# Patient Record
Sex: FEMALE | Race: WHITE | NOT HISPANIC OR LATINO | Employment: STUDENT | URBAN - METROPOLITAN AREA
[De-identification: names, ages, dates, MRNs, and addresses within clinical notes are randomized per-mention and may not be internally consistent; named-entity substitution may affect disease eponyms.]

---
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
## 2024-01-11 ENCOUNTER — OFFICE VISIT (OUTPATIENT)
Dept: OBGYN CLINIC | Facility: CLINIC | Age: 19
End: 2024-01-11
Payer: COMMERCIAL

## 2024-01-11 ENCOUNTER — APPOINTMENT (OUTPATIENT)
Dept: RADIOLOGY | Facility: CLINIC | Age: 19
End: 2024-01-11
Payer: COMMERCIAL

## 2024-01-11 ENCOUNTER — ATHLETIC TRAINING (OUTPATIENT)
Dept: SPORTS MEDICINE | Facility: OTHER | Age: 19
End: 2024-01-11

## 2024-01-11 VITALS
SYSTOLIC BLOOD PRESSURE: 121 MMHG | HEART RATE: 68 BPM | HEIGHT: 68 IN | WEIGHT: 141 LBS | BODY MASS INDEX: 21.37 KG/M2 | DIASTOLIC BLOOD PRESSURE: 74 MMHG

## 2024-01-11 DIAGNOSIS — S43.102A AC SEPARATION, LEFT, INITIAL ENCOUNTER: Primary | ICD-10-CM

## 2024-01-11 DIAGNOSIS — M25.512 ACUTE PAIN OF LEFT SHOULDER: Primary | ICD-10-CM

## 2024-01-11 DIAGNOSIS — M25.512 LEFT SHOULDER PAIN, UNSPECIFIED CHRONICITY: ICD-10-CM

## 2024-01-11 DIAGNOSIS — S40.012A CONTUSION OF LEFT SHOULDER, INITIAL ENCOUNTER: ICD-10-CM

## 2024-01-11 PROCEDURE — 99214 OFFICE O/P EST MOD 30 MIN: CPT | Performed by: ORTHOPAEDIC SURGERY

## 2024-01-11 PROCEDURE — 73030 X-RAY EXAM OF SHOULDER: CPT

## 2024-01-11 RX ORDER — NORETHINDRONE ACETATE AND ETHINYL ESTRADIOL .02; 1 MG/1; MG/1
1 TABLET ORAL DAILY
COMMUNITY
Start: 2023-12-24

## 2024-01-11 NOTE — LETTER
January 11, 2024     Vinod Yokastafernando    Patient: Jessica Domingo   YOB: 2005   Date of Visit: 1/11/2024       Thank you for referring Jessica Domingo to me for evaluation. Below are my notes for this consultation.    If you have questions, please do not hesitate to call me. I look forward to following your patient along with you.         Sincerely,        Francis Aguirre DO        CC: No Recipients    Francis Aguirre DO  1/11/2024  4:48 PM  Sign when Signing Visit  Assessment/Plan:  1. AC separation, left, initial encounter  XR shoulder 2+ vw left      2. Contusion of left shoulder, initial encounter            Jessica has left shoulder pain after her fall yesterday and appears to have symptoms consistent with an AC separation in the shoulder.  Most of her pain can be attributed to the AC joint she also has discomfort in the scapular region but no obvious finding of fracture on x-ray.  I do think she might have a contusion of her scapula after the fall.  X-rays today show no clear abnormality and she has limitations her exam based on her pain.  I recommended use of a sling and follow-up in the office in 1 week for repeat evaluation.  If there is continued clinical concern and no significant progress at that point we could consider MRI.    Subjective:   Jessica Domingo is a 18 y.o. female who presents to the office for evaluation for left-sided shoulder pain.  She is a [employer] .  She injured her shoulder yesterday during a basketball game when she was taking a charge and fell backward on her left arm and shoulder.  She had pain and discomfort was removed from the game.  She has been having increased pain in the shoulder and difficulty lifting her arm.  She denies any numbness down her arm.  She has been treated with a sling.  There was concern for an AC separation versus clavicle fracture based on her examination.  She also has increased pain posteriorly in her  shoulder.      Review of Systems   Constitutional:  Negative for chills, fever and unexpected weight change.   HENT:  Negative for hearing loss, nosebleeds and sore throat.    Eyes:  Negative for pain, redness and visual disturbance.   Respiratory:  Negative for cough, shortness of breath and wheezing.    Cardiovascular:  Negative for chest pain, palpitations and leg swelling.   Gastrointestinal:  Negative for abdominal pain, nausea and vomiting.   Endocrine: Negative for polydipsia and polyuria.   Genitourinary:  Negative for dysuria and hematuria.   Musculoskeletal:         See HPI   Skin:  Negative for rash and wound.   Neurological:  Negative for dizziness, numbness and headaches.   Psychiatric/Behavioral:  Negative for decreased concentration and suicidal ideas. The patient is not nervous/anxious.          History reviewed. No pertinent past medical history.    History reviewed. No pertinent surgical history.    History reviewed. No pertinent family history.    Social History     Occupational History   • Not on file   Tobacco Use   • Smoking status: Never   • Smokeless tobacco: Never   Vaping Use   • Vaping status: Never Used   Substance and Sexual Activity   • Alcohol use: Never   • Drug use: Never   • Sexual activity: Not on file         Current Outpatient Medications:   •  norethindrone-ethinyl estradiol (MICROGESTIN 1/20) 1-20 MG-MCG per tablet, Take 1 tablet by mouth daily As directed, Disp: , Rfl:   •  Blisovi FE 1/20 1-20 MG-MCG per tablet, , Disp: , Rfl:     Allergies   Allergen Reactions   • Penicillins Rash and Dermatitis       Objective:  Vitals:    01/11/24 1344   BP: 121/74   Pulse: 68     Pain Score:   9      Left Shoulder Exam     Tenderness   The patient is experiencing tenderness in the acromioclavicular joint, clavicle and acromion (Scapular spine).    Range of Motion   Active abduction:  40 abnormal   Passive abduction:  50 abnormal   External rotation:  normal   Forward flexion:  50 abnormal      Muscle Strength   Abduction: 4/5   Internal rotation: 4/5   External rotation: 4/5   Supraspinatus: 4/5   Subscapularis: 4/5     Tests   Cross arm: positive  Drop arm: negative    Other   Erythema: absent  Sensation: normal  Pulse: present             Physical Exam  Vitals and nursing note reviewed.   Constitutional:       Appearance: Normal appearance. She is well-developed.   HENT:      Head: Normocephalic and atraumatic.      Right Ear: External ear normal.      Left Ear: External ear normal.   Eyes:      General: No scleral icterus.     Extraocular Movements: Extraocular movements intact.      Conjunctiva/sclera: Conjunctivae normal.   Cardiovascular:      Rate and Rhythm: Normal rate.   Pulmonary:      Effort: Pulmonary effort is normal. No respiratory distress.   Musculoskeletal:      Cervical back: Normal range of motion and neck supple.      Comments: See Ortho exam   Skin:     General: Skin is warm and dry.   Neurological:      General: No focal deficit present.      Mental Status: She is alert and oriented to person, place, and time.   Psychiatric:         Behavior: Behavior normal.         I have personally reviewed pertinent films in PACS and my interpretation is as follows:  X-ray left shoulder demonstrates no acute abnormality.      This document was created using speech voice recognition software.   Grammatical errors, random word insertions, pronoun errors, and incomplete sentences are an occasional consequence of this system due to software limitations, ambient noise, and hardware issues.   Any formal questions or concerns about content, text, or information contained within the body of this dictation should be directly addressed to the provider for clarification.

## 2024-01-11 NOTE — PROGRESS NOTES
AT Evaluation       Athlete came to AT room today wearing a sling on her left shoulder after falling in the game last night.  She is point tender along the Medial boarder, superior boarder spine of the scapula, the AC joint and distal end of the clavical.  She states the pain was 11 out of 10 yesterday and a 9 out of 10 today.  Limited ROM of shoulder flex, abduction, extension and ER due to pain actively and passively.  She denies any numbness into the hand or fingers.  Athlete has trouble with ADLs at this time.            Assessment/Plan    Subjective    Objective         Precautions:       Manuals                                                                 Neuro Re-Ed                                                                                                        Ther Ex                                                                                                                     Ther Activity                                       Gait Training                                       Modalities

## 2024-01-11 NOTE — LETTER
January 11, 2024     Patient: Jessica Domingo  YOB: 2005  Date of Visit: 1/11/2024      To Whom it May Concern:    Jessica Domingo is under my professional care. Jessica was seen in my office on 1/11/2024. Jessica should not return to gym class or sports until cleared by a physician.    If you have any questions or concerns, please don't hesitate to call.         Sincerely,          Francis Aguirre, DO

## 2024-01-11 NOTE — PROGRESS NOTES
Assessment/Plan:  1. AC separation, left, initial encounter  XR shoulder 2+ vw left      2. Contusion of left shoulder, initial encounter            Jessica has left shoulder pain after her fall yesterday and appears to have symptoms consistent with an AC separation in the shoulder.  Most of her pain can be attributed to the AC joint she also has discomfort in the scapular region but no obvious finding of fracture on x-ray.  I do think she might have a contusion of her scapula after the fall.  X-rays today show no clear abnormality and she has limitations her exam based on her pain.  I recommended use of a sling and follow-up in the office in 1 week for repeat evaluation.  If there is continued clinical concern and no significant progress at that point we could consider MRI.    Subjective:   Jessica Domingo is a 18 y.o. female who presents to the office for evaluation for left-sided shoulder pain.  She is a [employer] .  She injured her shoulder yesterday during a basketball game when she was taking a charge and fell backward on her left arm and shoulder.  She had pain and discomfort was removed from the game.  She has been having increased pain in the shoulder and difficulty lifting her arm.  She denies any numbness down her arm.  She has been treated with a sling.  There was concern for an AC separation versus clavicle fracture based on her examination.  She also has increased pain posteriorly in her shoulder.      Review of Systems   Constitutional:  Negative for chills, fever and unexpected weight change.   HENT:  Negative for hearing loss, nosebleeds and sore throat.    Eyes:  Negative for pain, redness and visual disturbance.   Respiratory:  Negative for cough, shortness of breath and wheezing.    Cardiovascular:  Negative for chest pain, palpitations and leg swelling.   Gastrointestinal:  Negative for abdominal pain, nausea and vomiting.   Endocrine: Negative for polydipsia and  polyuria.   Genitourinary:  Negative for dysuria and hematuria.   Musculoskeletal:         See HPI   Skin:  Negative for rash and wound.   Neurological:  Negative for dizziness, numbness and headaches.   Psychiatric/Behavioral:  Negative for decreased concentration and suicidal ideas. The patient is not nervous/anxious.          History reviewed. No pertinent past medical history.    History reviewed. No pertinent surgical history.    History reviewed. No pertinent family history.    Social History     Occupational History    Not on file   Tobacco Use    Smoking status: Never    Smokeless tobacco: Never   Vaping Use    Vaping status: Never Used   Substance and Sexual Activity    Alcohol use: Never    Drug use: Never    Sexual activity: Not on file         Current Outpatient Medications:     norethindrone-ethinyl estradiol (MICROGESTIN 1/20) 1-20 MG-MCG per tablet, Take 1 tablet by mouth daily As directed, Disp: , Rfl:     Blisovi FE 1/20 1-20 MG-MCG per tablet, , Disp: , Rfl:     Allergies   Allergen Reactions    Penicillins Rash and Dermatitis       Objective:  Vitals:    01/11/24 1344   BP: 121/74   Pulse: 68     Pain Score:   9      Left Shoulder Exam     Tenderness   The patient is experiencing tenderness in the acromioclavicular joint, clavicle and acromion (Scapular spine).    Range of Motion   Active abduction:  40 abnormal   Passive abduction:  50 abnormal   External rotation:  normal   Forward flexion:  50 abnormal     Muscle Strength   Abduction: 4/5   Internal rotation: 4/5   External rotation: 4/5   Supraspinatus: 4/5   Subscapularis: 4/5     Tests   Cross arm: positive  Drop arm: negative    Other   Erythema: absent  Sensation: normal  Pulse: present             Physical Exam  Vitals and nursing note reviewed.   Constitutional:       Appearance: Normal appearance. She is well-developed.   HENT:      Head: Normocephalic and atraumatic.      Right Ear: External ear normal.      Left Ear: External ear  normal.   Eyes:      General: No scleral icterus.     Extraocular Movements: Extraocular movements intact.      Conjunctiva/sclera: Conjunctivae normal.   Cardiovascular:      Rate and Rhythm: Normal rate.   Pulmonary:      Effort: Pulmonary effort is normal. No respiratory distress.   Musculoskeletal:      Cervical back: Normal range of motion and neck supple.      Comments: See Ortho exam   Skin:     General: Skin is warm and dry.   Neurological:      General: No focal deficit present.      Mental Status: She is alert and oriented to person, place, and time.   Psychiatric:         Behavior: Behavior normal.         I have personally reviewed pertinent films in PACS and my interpretation is as follows:  X-ray left shoulder demonstrates no acute abnormality.      This document was created using speech voice recognition software.   Grammatical errors, random word insertions, pronoun errors, and incomplete sentences are an occasional consequence of this system due to software limitations, ambient noise, and hardware issues.   Any formal questions or concerns about content, text, or information contained within the body of this dictation should be directly addressed to the provider for clarification.

## 2024-01-17 ENCOUNTER — OFFICE VISIT (OUTPATIENT)
Dept: OBGYN CLINIC | Facility: CLINIC | Age: 19
End: 2024-01-17
Payer: COMMERCIAL

## 2024-01-17 VITALS
WEIGHT: 141 LBS | HEART RATE: 60 BPM | HEIGHT: 68 IN | BODY MASS INDEX: 21.37 KG/M2 | DIASTOLIC BLOOD PRESSURE: 80 MMHG | SYSTOLIC BLOOD PRESSURE: 119 MMHG

## 2024-01-17 DIAGNOSIS — S43.102D AC SEPARATION, LEFT, SUBSEQUENT ENCOUNTER: Primary | ICD-10-CM

## 2024-01-17 PROCEDURE — 99213 OFFICE O/P EST LOW 20 MIN: CPT | Performed by: ORTHOPAEDIC SURGERY

## 2024-01-17 NOTE — LETTER
January 17, 2024     Patient: Jessica Domingo  YOB: 2005  Date of Visit: 1/17/2024      To Whom it May Concern:    Jessica Domingo is under my professional care. Jessica was seen in my office on 1/17/2024. Jessica is cleared for sports without restriction.    If you have any questions or concerns, please don't hesitate to call.         Sincerely,          Francis Aguirre,         CC: No Recipients

## 2024-01-17 NOTE — PROGRESS NOTES
Assessment/Plan:  1. AC separation, left, subsequent encounter          Jessica has significant proved in her left shoulder pain.  She has no significant pain over the A/C joint or posterior shoulder along the spine of the scapula like she did last week.  I am clearing her for sports without restriction.  She will continue to follow-up with her athletic training staff.  She will follow-up with me as needed.      Subjective:   Jessica Domingo is a 18 y.o. female who presents to the office for follow-up for left-sided shoulder pain.  She is a [employer]  who was injured 1 week ago during a basketball game taking a charge landing on her left shoulder.  She had pain consistent with a grade 1 AC separation and contusion to the posterior aspect the shoulder.  She has rested in a sling for the last 1 week and reports much improvement in her shoulder pain.  She has no pain today and has not had pain for the last several days.  She denies any new injury.  She was held out of basketball.  She does have basketball game today.      Review of Systems   Constitutional:  Negative for chills, fever and unexpected weight change.   HENT:  Negative for hearing loss, nosebleeds and sore throat.    Eyes:  Negative for pain, redness and visual disturbance.   Respiratory:  Negative for cough, shortness of breath and wheezing.    Cardiovascular:  Negative for chest pain, palpitations and leg swelling.   Gastrointestinal:  Negative for abdominal pain, nausea and vomiting.   Endocrine: Negative for polydipsia and polyuria.   Genitourinary:  Negative for dysuria and hematuria.   Musculoskeletal:         See HPI   Skin:  Negative for rash and wound.   Neurological:  Negative for dizziness, numbness and headaches.   Psychiatric/Behavioral:  Negative for decreased concentration and suicidal ideas. The patient is not nervous/anxious.          History reviewed. No pertinent past medical history.    History reviewed. No  pertinent surgical history.    History reviewed. No pertinent family history.    Social History     Occupational History    Not on file   Tobacco Use    Smoking status: Never    Smokeless tobacco: Never   Vaping Use    Vaping status: Never Used   Substance and Sexual Activity    Alcohol use: Never    Drug use: Never    Sexual activity: Not on file         Current Outpatient Medications:     Blisovi FE 1/20 1-20 MG-MCG per tablet, , Disp: , Rfl:     norethindrone-ethinyl estradiol (MICROGESTIN 1/20) 1-20 MG-MCG per tablet, Take 1 tablet by mouth daily As directed, Disp: , Rfl:     Allergies   Allergen Reactions    Penicillins Rash and Dermatitis       Objective:  Vitals:    01/17/24 0812   BP: 119/80   Pulse: 60     Pain Score:   1      Left Shoulder Exam     Tenderness   The patient is experiencing no tenderness.     Range of Motion   Active abduction:  normal   Passive abduction:  normal   Extension:  normal   External rotation:  normal   Forward flexion:  normal   Internal rotation 0 degrees:  normal     Muscle Strength   Abduction: 5/5   Internal rotation: 5/5   External rotation: 5/5   Supraspinatus: 5/5   Subscapularis: 5/5   Biceps: 5/5     Tests   Soto test: negative  Cross arm: negative  Impingement: negative    Other   Erythema: absent  Sensation: normal  Pulse: present             Physical Exam  Vitals and nursing note reviewed.   Constitutional:       Appearance: Normal appearance. She is well-developed.   HENT:      Head: Normocephalic and atraumatic.      Right Ear: External ear normal.      Left Ear: External ear normal.   Eyes:      General: No scleral icterus.     Extraocular Movements: Extraocular movements intact.      Conjunctiva/sclera: Conjunctivae normal.   Cardiovascular:      Rate and Rhythm: Normal rate.   Pulmonary:      Effort: Pulmonary effort is normal. No respiratory distress.   Musculoskeletal:      Cervical back: Normal range of motion and neck supple.      Comments: See Ortho exam    Skin:     General: Skin is warm and dry.   Neurological:      General: No focal deficit present.      Mental Status: She is alert and oriented to person, place, and time.   Psychiatric:         Behavior: Behavior normal.               This document was created using speech voice recognition software.   Grammatical errors, random word insertions, pronoun errors, and incomplete sentences are an occasional consequence of this system due to software limitations, ambient noise, and hardware issues.   Any formal questions or concerns about content, text, or information contained within the body of this dictation should be directly addressed to the provider for clarification.

## 2024-12-11 ENCOUNTER — ATHLETIC TRAINING (OUTPATIENT)
Dept: SPORTS MEDICINE | Facility: OTHER | Age: 19
End: 2024-12-11

## 2024-12-11 DIAGNOSIS — M25.562 ACUTE PAIN OF LEFT KNEE: Primary | ICD-10-CM

## 2024-12-12 ENCOUNTER — APPOINTMENT (OUTPATIENT)
Dept: RADIOLOGY | Facility: CLINIC | Age: 19
End: 2024-12-12
Payer: COMMERCIAL

## 2024-12-12 ENCOUNTER — OFFICE VISIT (OUTPATIENT)
Dept: OBGYN CLINIC | Facility: CLINIC | Age: 19
End: 2024-12-12
Payer: COMMERCIAL

## 2024-12-12 VITALS
WEIGHT: 141 LBS | BODY MASS INDEX: 21.37 KG/M2 | HEIGHT: 68 IN | HEART RATE: 54 BPM | DIASTOLIC BLOOD PRESSURE: 73 MMHG | SYSTOLIC BLOOD PRESSURE: 118 MMHG

## 2024-12-12 DIAGNOSIS — M25.562 LEFT KNEE PAIN, UNSPECIFIED CHRONICITY: ICD-10-CM

## 2024-12-12 DIAGNOSIS — M70.52 PATELLAR BURSITIS OF LEFT KNEE: ICD-10-CM

## 2024-12-12 DIAGNOSIS — S80.02XA PATELLAR CONTUSION, LEFT, INITIAL ENCOUNTER: Primary | ICD-10-CM

## 2024-12-12 PROCEDURE — 73562 X-RAY EXAM OF KNEE 3: CPT

## 2024-12-12 PROCEDURE — 99214 OFFICE O/P EST MOD 30 MIN: CPT | Performed by: ORTHOPAEDIC SURGERY

## 2024-12-12 NOTE — PROGRESS NOTES
Assessment/Plan:  1. Patellar contusion, left, initial encounter  XR knee 3 vw left non injury      2. Patellar bursitis of left knee            Yusra has left-sided knee pain consistent with a pretty severe bone contusion and ruptured bursa anterior to the patella.  She does not appear to have any intra-articular injury or knee effusion on today's exam.  Her ligamentous examination appears stable as well.  She can straight leg raise with pain but she is able to perform the function.  I recommended ice, compression and activity as tolerated.  She can certainly use crutches but may begin weightbearing over the next few days.  If pain persists or worsens we could consider further imaging however initial studies appear normal.    Subjective:   Jessica Domingo is a 19 y.o. female who presents to the office for evaluation for left-sided knee pain.  She is a [place]  and had an injury yesterday where she was playing and struck her left knee directly on an opponent's left knee.  She had immediate pain and swelling and difficulty walking.  She was treated by her athletic training staff with swelling directly over the patella.  She has been icing and compressing using crutches since her injury.  She presents today with aching throbbing pain to the anterior aspect the knee and difficulty walking.      Review of Systems   Constitutional:  Negative for chills, fever and unexpected weight change.   HENT:  Negative for hearing loss, nosebleeds and sore throat.    Eyes:  Negative for pain, redness and visual disturbance.   Respiratory:  Negative for cough, shortness of breath and wheezing.    Cardiovascular:  Negative for chest pain, palpitations and leg swelling.   Gastrointestinal:  Negative for abdominal pain, nausea and vomiting.   Endocrine: Negative for polydipsia and polyuria.   Genitourinary:  Negative for dysuria and hematuria.   Musculoskeletal:         See HPI   Skin:  Negative for rash and wound.    Neurological:  Negative for dizziness, numbness and headaches.   Psychiatric/Behavioral:  Negative for decreased concentration and suicidal ideas. The patient is not nervous/anxious.          History reviewed. No pertinent past medical history.    History reviewed. No pertinent surgical history.    History reviewed. No pertinent family history.    Social History     Occupational History    Not on file   Tobacco Use    Smoking status: Never    Smokeless tobacco: Never   Vaping Use    Vaping status: Never Used   Substance and Sexual Activity    Alcohol use: Never    Drug use: Never    Sexual activity: Not on file         Current Outpatient Medications:     norethindrone-ethinyl estradiol (MICROGESTIN 1/20) 1-20 MG-MCG per tablet, Take 1 tablet by mouth daily As directed, Disp: , Rfl:     Blisovi FE 1/20 1-20 MG-MCG per tablet, , Disp: , Rfl:     Allergies   Allergen Reactions    Penicillins Rash and Dermatitis       Objective:  Vitals:    12/12/24 1317   BP: 118/73   Pulse: (!) 54     Pain Score:   8      Left Knee Exam     Tenderness   The patient is experiencing tenderness in the patella.    Range of Motion   Extension:  normal   Flexion:  120 abnormal     Tests   David:  Medial - negative Lateral - negative   Valgus: negative  Lachman:  Anterior - negative      Drawer:  Anterior - negative     Posterior - negative    Other   Erythema: absent  Sensation: normal  Pulse: present  Swelling: moderate (Localized prepatellar swelling/ecchymosis)  Effusion: no effusion present          Observations   Left Knee   Negative for effusion.       Physical Exam  Vitals and nursing note reviewed.   Constitutional:       Appearance: Normal appearance. She is well-developed.   HENT:      Head: Normocephalic and atraumatic.      Right Ear: External ear normal.      Left Ear: External ear normal.   Eyes:      General: No scleral icterus.     Extraocular Movements: Extraocular movements intact.      Conjunctiva/sclera:  Conjunctivae normal.   Cardiovascular:      Rate and Rhythm: Normal rate.   Pulmonary:      Effort: Pulmonary effort is normal. No respiratory distress.   Musculoskeletal:      Cervical back: Normal range of motion and neck supple.      Left knee: No effusion.      Instability Tests: Medial David test negative and lateral David test negative.      Comments: See Ortho exam   Skin:     General: Skin is warm and dry.   Neurological:      General: No focal deficit present.      Mental Status: She is alert and oriented to person, place, and time.   Psychiatric:         Behavior: Behavior normal.         I have personally reviewed pertinent films in PACS and my interpretation is as follows:  X-ray of the left knee demonstrates no evidence of acute fracture or significant degenerative changes.  No sign of knee effusion.  Increased edema anterior to patella consistent with patellar bursitis/ rupture.      This document was created using speech voice recognition software.   Grammatical errors, random word insertions, pronoun errors, and incomplete sentences are an occasional consequence of this system due to software limitations, ambient noise, and hardware issues.   Any formal questions or concerns about content, text, or information contained within the body of this dictation should be directly addressed to the provider for clarification.

## 2024-12-12 NOTE — PROGRESS NOTES
AT Evaluation      In the first period of the game on 12/11/24, athlete collided knee to knee with the opposing player, causing pain to her left knee.  Was not sure if her knee buckled or shifted but complained of intense pain of the general knee and posterior knee immediately.  Able to walk off the court on her own power but with a limp and limited knee flexion.  Athlete is unable to explain the pain. Observable swelling over the patella, looks like an egg.     By the end of the game, athlete reported more pain and unable to walk due to the pain. Given crutches and wrapped for compression.  Athlete was given advil and tylenol for the pain.  Explained to alternate meds and to follow the timelines on the package.       Eval:   ROM limited due to pain and swelling of the pre-patellar area.     Palpation:   Pain of the patella, MCL, LCL, Lateral Joint line, femoral condyles, and medial hamstring tendons.    Special tests:  Valgus positive for pain  Varus positive for pain  McMurrays positive for pain but no catching or clicking  Lachmans positive for pain.  Athlete reported weird feeling  Anterior Drawer positive for pain.     Referred to provider for further eval.   Assessment/Plan    Subjective    Objective         Precautions:       Manuals                                                                 Neuro Re-Ed                                                                                                        Ther Ex                                                                                                                     Ther Activity                                       Gait Training                                       Modalities